# Patient Record
Sex: MALE | Race: WHITE | URBAN - METROPOLITAN AREA
[De-identification: names, ages, dates, MRNs, and addresses within clinical notes are randomized per-mention and may not be internally consistent; named-entity substitution may affect disease eponyms.]

---

## 2021-02-11 ENCOUNTER — OFFICE VISIT (OUTPATIENT)
Dept: ORTHOPEDIC SURGERY | Age: 21
End: 2021-02-11
Payer: COMMERCIAL

## 2021-02-11 VITALS — HEIGHT: 71 IN | WEIGHT: 175 LBS | BODY MASS INDEX: 24.5 KG/M2

## 2021-02-11 DIAGNOSIS — S63.502A SPRAIN OF LEFT WRIST, INITIAL ENCOUNTER: Primary | ICD-10-CM

## 2021-02-11 PROCEDURE — 99203 OFFICE O/P NEW LOW 30 MIN: CPT | Performed by: ORTHOPAEDIC SURGERY

## 2021-02-11 PROCEDURE — L3908 WHO COCK-UP NONMOLDE PRE OTS: HCPCS | Performed by: ORTHOPAEDIC SURGERY

## 2021-02-11 NOTE — LETTER
2/11/2021 RE: Yun Shirts To Whom It May Concern, This is to certify that Yun Shirts may return to work with no lifting greater than 5lbs for 2 weeks, also please allow him to wear wrist splint while working. Please feel free to contact my office if you have any questions or concerns. Sincerely, 
Sameer Barcenas MD

## 2021-02-11 NOTE — PROGRESS NOTES
Name: Acosta Lares    : 2000     Service Dept: [address] [place] [address] and Sports Medicine    Patient's Pharmacies:    [facility] [address] [id number] [facility] [id number]  Phone: [phone] Fax: [phone]       Chief Complaint   Patient presents with    Wrist Pain        Visit Vitals  Ht 5' 11\" (1.803 m)   Wt 175 lb (79.4 kg)   BMI 24.41 kg/m²      No Known Allergies      There is no problem list on file for this patient. Family History   Problem Relation Age of Onset    No Known Problems Mother     No Known Problems Father       Social History     Socioeconomic History    Marital status: SINGLE     Spouse name: Not on file    Number of children: Not on file    Years of education: Not on file    Highest education level: Not on file   Tobacco Use    Smoking status: Never Smoker    Smokeless tobacco: Never Used   Substance and Sexual Activity    Alcohol use: Not Currently    Drug use: Never    Sexual activity: Not Currently      History reviewed. No pertinent surgical history. History reviewed. No pertinent past medical history. I have reviewed and agree with [address] [place] [address] Nw and ROS and intake form in chart and the record furthermore I have reviewed prior medical record(s) regarding this patients care during this appointment. Review of Systems:   Patient is a pleasant appearing individual, appropriately dressed, well hydrated, well nourished, who is alert, appropriately oriented for age, and in no acute distress with a normal gait and normal affect who does not appear to be in any significant pain. Physical Exam:  Left wrist - grossly neurovascularly intact, good cap refill, positive tenderness to palpation, positive soft tissue swelling, decreased range of motion, decreased strength, skin intact.     Right wrist- Grossly neurovascularly intact, good cap refill, full range of motion, no weakness, no swelling, no point tenderness, no skin lesions. Please note that a DME was provided from our office and fitted to an appropriate size. DME provided will help decrease soft tissue swelling, assist with stabilization, and decrease pain with immobilization. Encounter Diagnoses     ICD-10-CM ICD-9-CM   1. Sprain of left wrist, initial encounter  S63.502A 842.00       HPI:  The patient is here with a chief complaint of left wrist pain, been having it for a while now, sharp pain ever since he fell. Pain is 7/10. X-rays of his left wrist since his fall are unremarkable with no evidence of any fracture. Assessment/Plan:  1. Left wrist sprain. No evidence of any fracture. Plan at this point, ice, elevate, antiinflammatories, and wrist splint. We will give him a note for work saying no lifting greater than 5 pounds. We will see him back in 2 weeks. If he is not better, we may consider treatment options. Return to Office: Follow-up and Dispositions    · Return in about 2 weeks (around 2/25/2021). Scribed by Corky Dodge MD as dictated by Irving Carver. Ksenia Collazo MD.  Documentation True and Accepted Sameer Collazo MD

## 2021-02-11 NOTE — PATIENT INSTRUCTIONS
Wrist Sprain: Care Instructions Your Care Instructions Your wrist hurts because you have stretched or torn ligaments, which connect the bones in your wrist. 
Wrist sprains usually take from 2 to 10 weeks to heal, but some take longer. Usually, the more pain you have, the more severe your wrist sprain is and the longer it will take to heal. You can heal faster and regain strength in your wrist with good home treatment. Follow-up care is a key part of your treatment and safety. Be sure to make and go to all appointments, and call your doctor if you are having problems. It's also a good idea to know your test results and keep a list of the medicines you take. How can you care for yourself at home? · Prop up your arm on a pillow when you ice it or anytime you sit or lie down for the next 3 days. Try to keep your wrist above the level of your heart. This will help reduce swelling. · Put ice or cold packs on your wrist for 10 to 20 minutes at a time. Try to do this every 1 to 2 hours for the next 3 days (when you are awake) or until the swelling goes down. Put a thin cloth between the ice pack and your skin. · After 2 or 3 days, if your swelling is gone, apply a heating pad set on low or a warm cloth to your wrist. This helps keep your wrist flexible. Some doctors suggest that you go back and forth between hot and cold. · If you have an elastic bandage, keep it on for the next 24 to 36 hours. The bandage should be snug but not so tight that it causes numbness or tingling. To rewrap the wrist, wrap the bandage around the hand a few times, beginning at the fingers. Then wrap it around the hand between the thumb and index finger, ending by circling the wrist several times. · If your doctor gave you a splint or brace, wear it as directed to protect your wrist until it has healed. · Take pain medicines exactly as directed. ? If the doctor gave you a prescription medicine for pain, take it as prescribed. ? If you are not taking a prescription pain medicine, ask your doctor if you can take an over-the-counter medicine. · Try not to use your injured wrist and hand. When should you call for help? Call your doctor now or seek immediate medical care if: 
  · Your hand or fingers are cool or pale or change color. Watch closely for changes in your health, and be sure to contact your doctor if: 
  · Your pain gets worse.  
  · Your wrist has not improved after 1 week. Where can you learn more? Go to http://www.gray.com/ Enter G541 in the search box to learn more about \"Wrist Sprain: Care Instructions. \" Current as of: March 2, 2020               Content Version: 12.6 © 6274-6815 [employer]. Care instructions adapted under license by [employer] (which disclaims liability or warranty for this information). If you have questions about a medical condition or this instruction, always ask your healthcare professional. [employer] any warranty or liability for your use of this information.